# Patient Record
Sex: FEMALE | Race: WHITE | Employment: FULL TIME | URBAN - METROPOLITAN AREA
[De-identification: names, ages, dates, MRNs, and addresses within clinical notes are randomized per-mention and may not be internally consistent; named-entity substitution may affect disease eponyms.]

---

## 2020-12-16 ENCOUNTER — OFFICE VISIT (OUTPATIENT)
Dept: INTERNAL MEDICINE CLINIC | Age: 35
End: 2020-12-16
Payer: COMMERCIAL

## 2020-12-16 ENCOUNTER — NURSE TRIAGE (OUTPATIENT)
Dept: OTHER | Facility: CLINIC | Age: 35
End: 2020-12-16

## 2020-12-16 VITALS
DIASTOLIC BLOOD PRESSURE: 70 MMHG | HEART RATE: 97 BPM | HEIGHT: 64 IN | SYSTOLIC BLOOD PRESSURE: 124 MMHG | BODY MASS INDEX: 40.97 KG/M2 | WEIGHT: 240 LBS | OXYGEN SATURATION: 97 % | TEMPERATURE: 98.4 F

## 2020-12-16 LAB
BASOPHILS ABSOLUTE: 0.1 K/UL (ref 0–0.2)
BASOPHILS RELATIVE PERCENT: 0.9 %
EOSINOPHILS ABSOLUTE: 0.3 K/UL (ref 0–0.6)
EOSINOPHILS RELATIVE PERCENT: 1.9 %
HCT VFR BLD CALC: 40.8 % (ref 36–48)
HEMOGLOBIN: 13.1 G/DL (ref 12–16)
LYMPHOCYTES ABSOLUTE: 1.7 K/UL (ref 1–5.1)
LYMPHOCYTES RELATIVE PERCENT: 11.8 %
MCH RBC QN AUTO: 30.3 PG (ref 26–34)
MCHC RBC AUTO-ENTMCNC: 32.2 G/DL (ref 31–36)
MCV RBC AUTO: 94 FL (ref 80–100)
MONOCYTES ABSOLUTE: 0.8 K/UL (ref 0–1.3)
MONOCYTES RELATIVE PERCENT: 5.7 %
NEUTROPHILS ABSOLUTE: 11.2 K/UL (ref 1.7–7.7)
NEUTROPHILS RELATIVE PERCENT: 79.7 %
PDW BLD-RTO: 13.1 % (ref 12.4–15.4)
PLATELET # BLD: 428 K/UL (ref 135–450)
PMV BLD AUTO: 9.4 FL (ref 5–10.5)
RBC # BLD: 4.34 M/UL (ref 4–5.2)
WBC # BLD: 14.1 K/UL (ref 4–11)

## 2020-12-16 PROCEDURE — 99203 OFFICE O/P NEW LOW 30 MIN: CPT | Performed by: INTERNAL MEDICINE

## 2020-12-16 PROCEDURE — 36415 COLL VENOUS BLD VENIPUNCTURE: CPT | Performed by: INTERNAL MEDICINE

## 2020-12-16 PROCEDURE — 99385 PREV VISIT NEW AGE 18-39: CPT | Performed by: INTERNAL MEDICINE

## 2020-12-16 RX ORDER — AMOXICILLIN AND CLAVULANATE POTASSIUM 875; 125 MG/1; MG/1
1 TABLET, FILM COATED ORAL 2 TIMES DAILY
Qty: 14 TABLET | Refills: 0 | Status: SHIPPED | OUTPATIENT
Start: 2020-12-16 | End: 2020-12-23

## 2020-12-16 ASSESSMENT — PATIENT HEALTH QUESTIONNAIRE - PHQ9
SUM OF ALL RESPONSES TO PHQ QUESTIONS 1-9: 0
SUM OF ALL RESPONSES TO PHQ9 QUESTIONS 1 & 2: 0
2. FEELING DOWN, DEPRESSED OR HOPELESS: 0
SUM OF ALL RESPONSES TO PHQ QUESTIONS 1-9: 0
1. LITTLE INTEREST OR PLEASURE IN DOING THINGS: 0
SUM OF ALL RESPONSES TO PHQ QUESTIONS 1-9: 0

## 2020-12-16 NOTE — TELEPHONE ENCOUNTER
Reason for Disposition   [1] Hearing loss in one or both ears AND [2] sudden onset AND [3] present now    Answer Assessment - Initial Assessment Questions  1. DESCRIPTION: \"What type of hearing problem are you having? Describe it for me. \" (e.g., complete hearing loss, partial loss)      Hearing loss in right ear   2. LOCATION: \"One or both ears? \" If one, ask: \"Which ear? \"      Right ear   3. SEVERITY: \"Can you hear anything? \" If so, ask: \"What can you hear? \" (e.g., ticking watch, whisper, talking)    Muffled   4. ONSET: \"When did this begin? \" \"Did it start suddenly or come on gradually? \"      Monday   5. PATTERN: \"Does this come and go, or has it been constant since it started? \"      Constant   6. PAIN: \"Is there any pain in your ear(s)? \"  (Scale 1-10; or mild, moderate, severe)      8/10  7. CAUSE: \"What do you think is causing this hearing problem? \"      Unknown   8. OTHER SYMPTOMS: \"Do you have any other symptoms? \" (e.g., dizziness, ringing in ears)      Swollen and sometimes red   9. PREGNANCY: \"Is there any chance you are pregnant? \" \"When was your last menstrual period? \"    Protocols used: HEARING LOSS OR CHANGE-ADULT-AH    Patient called pre-service center [facility] with red flag complaint. Brief description of triage: pt reports having pain in her right ear since Monday and note pain 8/10 that is constant. She notes having some swelling in the right side of her face as well    Triage indicates for patient to see pcp in 4 hours     Care advice provided, patient verbalizes understanding; denies any other questions or concerns; instructed to call back for any new or worsening symptoms. Writer provided warm transfer to [facility] for appointment scheduling. Attention Provider: Thank you for allowing me to participate in the care of your patient. The patient was connected to triage in response to information provided to the [facility].  Please do not respond through this encounter as the response is not directed to a shared pool.

## 2020-12-16 NOTE — PATIENT INSTRUCTIONS
[id number] [facility] [address], [address] [place] [address]  Ph: [phone]

## 2020-12-16 NOTE — PROGRESS NOTES
[facility]  History and Physical  Elaina Aguilar M.D.        Stephie Johnson  YOB: 1985    Date of Service:  12/16/2020    Chief Complaint:   Stephie Johnson is a 38 y.o. female who presents for   Chief Complaint   Patient presents with   [address]     right ear        HPI: Here for Annual Physical and Follow up. She complain of a severe ear pain for 2 days. Some drainage started today that's clear. No sore throat, cough, fever or chiills. She has not been swimming or gotten her ear wet except in showers.     No results found for: Gretchen Grade  Lab Results   Component Value Date     08/07/2018    K 4.2 08/07/2018     08/07/2018    CO2 25 08/07/2018    BUN 11 08/07/2018    CREATININE 0.92 08/07/2018    GLUCOSE 99 08/07/2018    CALCIUM 9.3 08/07/2018     Lab Results   Component Value Date    CHOL 186 08/07/2018    CHOL 153 08/07/2018    TRIG 149 08/07/2018    HDL 33 08/07/2018    HDL 43 05/10/2012    LDLCALC 123 08/07/2018     Lab Results   Component Value Date    ALT 37 08/07/2018    AST 32 08/07/2018     Lab Results   Component Value Date    TSH 1.57 05/10/2012     Lab Results   Component Value Date    WBC 5.5 10/20/2016    HGB 13.4 10/20/2016    HCT 39.5 10/20/2016    MCV 89.2 10/20/2016     10/20/2016     No results found for: INR   No results found for: PSA   No results found for: LABURIC     Wt Readings from Last 3 Encounters:   12/16/20 240 lb (108.9 kg)   10/20/16 220 lb (99.8 kg)   10/29/14 218 lb (98.9 kg)     BP Readings from Last 3 Encounters:   12/16/20 124/70   10/20/16 120/82   10/29/14 122/80       Patient Active Problem List   Diagnosis    Allergic rhinitis       No Known Allergies  No outpatient medications have been marked as taking for the 12/16/20 encounter (Office Visit) with Esteban Mc MD.       Past Medical History:   Diagnosis Date    HSV infection     lip     Past Surgical History:   Procedure Laterality Date     SECTION  2014    LEEP      TOE SURGERY  2012    NERVE RELEASE FOURTH LEFT TOE AND DEROTATIONAL ARTHROPLASTY     Family History   Problem Relation Age of Onset    Diabetes Maternal Grandfather     Stroke Maternal Grandfather     High Blood Pressure Maternal Grandfather     High Cholesterol Maternal Grandfather     Heart Disease Maternal Grandfather     Cancer Mother         Cervical    Cancer Maternal Aunt         Female CA requiring hysterectomy    High Cholesterol Maternal Grandmother     Cancer Maternal Grandmother         Cervical     Social History     Socioeconomic History    Marital status:      Spouse name: Not on file    Number of children: Not on file    Years of education: Not on file    Highest education level: Not on file   Occupational History    Not on file   Social Needs    Financial resource strain: Not on file    Food insecurity     Worry: Not on file     Inability: Not on file    Transportation needs     Medical: Not on file     Non-medical: Not on file   Tobacco Use    Smoking status: Never Smoker    Smokeless tobacco: Never Used   Substance and Sexual Activity    Alcohol use: No     Comment: Rare    Drug use: No    Sexual activity: Yes     Partners: Male   Lifestyle    Physical activity     Days per week: Not on file     Minutes per session: Not on file    Stress: Not on file   Relationships    Social connections     Talks on phone: Not on file     Gets together: Not on file     Attends Worship service: Not on file     Active member of club or organization: Not on file     Attends meetings of clubs or organizations: Not on file     Relationship status: Not on file    Intimate partner violence     Fear of current or ex partner: Not on file     Emotionally abused: Not on file     Physically abused: Not on file     Forced sexual activity: Not on file   Other Topics Concern    Not on file   Social History Narrative    Not on file Review of Systems:  A comprehensive review of systems was negative except for what was noted in the HPI. Physical Exam:   Vitals:    12/16/20 1149   BP: 124/70   Pulse: 97   Temp: 98.4 °F (36.9 °C)   TempSrc: Infrared   SpO2: 97%   Weight: 240 lb (108.9 kg)   Height: 5' 4\" (1.626 m)     Body mass index is 41.2 kg/m². Constitutional: She is oriented to person, place, and time. She appears well-developed and well-nourished. No distress. HEENT:   Head: Normocephalic and atraumatic. Right Ear: Tympanic membrane, external ear and ear canal normal.   Left Ear: Tympanic membrane, external ear and ear canal normal.   Mouth/Throat: Oropharynx is clear and moist, and mucous membranes are normal.  There is no cervical adenopathy. Eyes: Conjunctivae and extraocular motions are normal. Pupils are equal, round, and reactive to light. Neck: Supple. No JVD present. Carotid bruit is not present. No mass and no thyromegaly present. Cardiovascular: Normal rate, regular rhythm, normal heart sounds and intact distal pulses. Exam reveals no gallop and no friction rub. No murmur heard. Pulmonary/Chest: Effort normal and breath sounds normal. No respiratory distress. She has no wheezes, rhonchi or rales. Abdominal: Soft, non-tender. Bowel sounds and aorta are normal. She exhibits no organomegaly, mass or bruit. Genitourinary: performed by gynecologist.  Breast exam:  performed by specialist.  Musculoskeletal: Normal range of motion, no synovitis. She exhibits no edema. Neurological: She is alert and oriented to person, place, and time. She has normal reflexes. No cranial nerve deficit. Coordination normal.   Skin: Skin is warm and dry. There is no rash or erythema. No suspicious lesions noted. Psychiatric: She has a normal mood and affect.  Her speech is normal and behavior is normal. Judgment, cognition and memory are normal.       Preventive Care:  Health Maintenance   Topic Date Due    Varicella vaccine (1 of 2 - 2-dose childhood series) 06/25/1984    HIV screen  06/25/1998    Cervical cancer screen  12/31/2016    Flu vaccine (1) 09/01/2020    DTaP/Tdap/Td vaccine (2 - Td) 07/05/2024    Hepatitis A vaccine  Aged Out    Hepatitis B vaccine  Aged Out    Hib vaccine  Aged Out    Meningococcal (ACWY) vaccine  Aged Out    Pneumococcal 0-64 years Vaccine  Aged Out      Hx abnormal PAP: no  Sexual activity: has sex with males   Last eye exam: many years, normal  Exercise: walks 3 time(s) per week       Preventive plan of care for Gisel Alston        12/16/2020           Preventive Measures Status       Recommendations for screening                   Diabetes Screen  Glucose (mg/dL)   Date Value   08/07/2018 99    Test recommended and ordered   Cholesterol Screen  Lab Results   Component Value Date    CHOL 186 08/07/2018    CHOL 153 (H) 08/07/2018    TRIG 149 08/07/2018    HDL 33 (L) 08/07/2018    LDLCALC 123 (H) 08/07/2018    Test recommended and ordered       Weight: Body mass index is 41.2 kg/m².   5' 4\" (1.626 m)240 lb (108.9 kg)    Your BMI is 25 or greater, which indicates that you are overweight        Recommended Immunizations    Immunization History   Administered Date(s) Administered    Tdap (Boostrix, Adacel) 07/05/2014        Influenza vaccine:  recommended yearly, but declined         Other Recommendations ·   See a dentist every 6 months  · Try to get at least 30 minutes of exercise 3-5 days per week  · Always wear a seat belt when traveling in a car  · Always wear a helmet when riding a bicycle or motorcycle  · When exposed to the sun, use a sunscreen that protects against both UVA and UVB radiation with an SPF of 30 or greater- reapply every 2 to 3 hours or after sweating, drying off with a towel, or swimming  · You need 500 mg of calcium and 7747-6270 IU of vitamin D per day- it is possible to meet your calcium requirement with diet alone, but a vitamin D supplement is usually necessary Assessment/Plan:    Meliton michelle was seen today for establish care and otalgia. Diagnoses and all orders for this visit:    Annual physical exam  -     Lipid Panel  -     Comprehensive Metabolic Panel  -     CBC Auto Differential    Acute diffuse otitis externa of right ear  -     amoxicillin-clavulanate (AUGMENTIN) 875-125 MG per tablet; Take 1 tablet by mouth 2 times daily for 7 days    if not better, pt to see [facility]    Return Fasting Physical in 1 year.

## 2020-12-17 LAB
A/G RATIO: 1.4 (ref 1.1–2.2)
ALBUMIN SERPL-MCNC: 4.5 G/DL (ref 3.4–5)
ALP BLD-CCNC: 142 U/L (ref 40–129)
ALT SERPL-CCNC: 53 U/L (ref 10–40)
ANION GAP SERPL CALCULATED.3IONS-SCNC: 11 MMOL/L (ref 3–16)
AST SERPL-CCNC: 35 U/L (ref 15–37)
BILIRUB SERPL-MCNC: 0.5 MG/DL (ref 0–1)
BUN BLDV-MCNC: 8 MG/DL (ref 7–20)
CALCIUM SERPL-MCNC: 9.6 MG/DL (ref 8.3–10.6)
CHLORIDE BLD-SCNC: 100 MMOL/L (ref 99–110)
CHOLESTEROL, TOTAL: 221 MG/DL (ref 0–199)
CO2: 27 MMOL/L (ref 21–32)
CREAT SERPL-MCNC: 0.7 MG/DL (ref 0.6–1.1)
GFR AFRICAN AMERICAN: >60
GFR NON-AFRICAN AMERICAN: >60
GLOBULIN: 3.3 G/DL
GLUCOSE BLD-MCNC: 100 MG/DL (ref 70–99)
HDLC SERPL-MCNC: 39 MG/DL (ref 40–60)
LDL CHOLESTEROL CALCULATED: 149 MG/DL
POTASSIUM SERPL-SCNC: 3.9 MMOL/L (ref 3.5–5.1)
SODIUM BLD-SCNC: 138 MMOL/L (ref 136–145)
TOTAL PROTEIN: 7.8 G/DL (ref 6.4–8.2)
TRIGL SERPL-MCNC: 166 MG/DL (ref 0–150)
VLDLC SERPL CALC-MCNC: 33 MG/DL